# Patient Record
Sex: FEMALE | Race: WHITE | NOT HISPANIC OR LATINO | Employment: OTHER | URBAN - METROPOLITAN AREA
[De-identification: names, ages, dates, MRNs, and addresses within clinical notes are randomized per-mention and may not be internally consistent; named-entity substitution may affect disease eponyms.]

---

## 2017-01-16 ENCOUNTER — FOLLOW UP AND POST INJECTION EVALUATION (OUTPATIENT)
Dept: URBAN - METROPOLITAN AREA CLINIC 26 | Facility: CLINIC | Age: 62
End: 2017-01-16

## 2017-01-16 VITALS — DIASTOLIC BLOOD PRESSURE: 80 MMHG | HEIGHT: 55 IN | HEART RATE: 82 BPM | SYSTOLIC BLOOD PRESSURE: 120 MMHG

## 2017-01-16 DIAGNOSIS — H02.836: ICD-10-CM

## 2017-01-16 DIAGNOSIS — H25.9: ICD-10-CM

## 2017-01-16 DIAGNOSIS — H35.3211: ICD-10-CM

## 2017-01-16 DIAGNOSIS — H35.713: ICD-10-CM

## 2017-01-16 DIAGNOSIS — H02.833: ICD-10-CM

## 2017-01-16 DIAGNOSIS — H35.3122: ICD-10-CM

## 2017-01-16 DIAGNOSIS — H43.393: ICD-10-CM

## 2017-01-16 PROCEDURE — 92250 FUNDUS PHOTOGRAPHY W/I&R: CPT

## 2017-01-16 PROCEDURE — 92235 FLUORESCEIN ANGRPH MLTIFRAME: CPT

## 2017-01-16 PROCEDURE — 92014 COMPRE OPH EXAM EST PT 1/>: CPT

## 2017-01-16 ASSESSMENT — TONOMETRY
OS_IOP_MMHG: 17
OD_IOP_MMHG: 17

## 2017-01-16 ASSESSMENT — VISUAL ACUITY
OD_SC: 20/25-2
OS_SC: 20/25+1

## 2017-01-23 ENCOUNTER — CLINIC PROCEDURE ONLY (OUTPATIENT)
Dept: URBAN - METROPOLITAN AREA CLINIC 26 | Facility: CLINIC | Age: 62
End: 2017-01-23

## 2017-01-23 DIAGNOSIS — H35.3211: ICD-10-CM

## 2017-01-23 PROCEDURE — 67028 INJECTION EYE DRUG: CPT

## 2017-01-23 ASSESSMENT — TONOMETRY: OD_IOP_MMHG: 13

## 2017-01-23 ASSESSMENT — VISUAL ACUITY: OD_SC: 20/25-2

## 2017-04-03 NOTE — PATIENT DISCUSSION
(C00.1224) Primary open-angle glaucoma left eye mild stage - Assesment : Examination revealed Primary Open Angle Glaucoma. Iop slightly elevated today. - Plan : Monitor for changes. Advised patient to call our office with decreased vision or an increase in symptoms.  Rba's discussed with patient, patient wishes to proceed with SLT OS SLT OS

## 2017-04-17 ENCOUNTER — FOLLOW UP (OUTPATIENT)
Dept: URBAN - METROPOLITAN AREA CLINIC 26 | Facility: CLINIC | Age: 62
End: 2017-04-17

## 2017-04-17 VITALS — HEIGHT: 55 IN | HEART RATE: 84 BPM | SYSTOLIC BLOOD PRESSURE: 119 MMHG | DIASTOLIC BLOOD PRESSURE: 84 MMHG

## 2017-04-17 DIAGNOSIS — H43.393: ICD-10-CM

## 2017-04-17 DIAGNOSIS — H02.836: ICD-10-CM

## 2017-04-17 DIAGNOSIS — H25.9: ICD-10-CM

## 2017-04-17 DIAGNOSIS — H35.713: ICD-10-CM

## 2017-04-17 DIAGNOSIS — H02.833: ICD-10-CM

## 2017-04-17 DIAGNOSIS — H35.3211: ICD-10-CM

## 2017-04-17 DIAGNOSIS — H35.3122: ICD-10-CM

## 2017-04-17 PROCEDURE — 92134 CPTRZ OPH DX IMG PST SGM RTA: CPT

## 2017-04-17 PROCEDURE — 92014 COMPRE OPH EXAM EST PT 1/>: CPT

## 2017-04-17 ASSESSMENT — TONOMETRY
OD_IOP_MMHG: 11
OS_IOP_MMHG: 12

## 2017-04-17 ASSESSMENT — VISUAL ACUITY
OD_SC: 20/30-
OS_SC: 20/25-

## 2017-04-24 ENCOUNTER — CLINIC PROCEDURE ONLY (OUTPATIENT)
Dept: URBAN - METROPOLITAN AREA CLINIC 26 | Facility: CLINIC | Age: 62
End: 2017-04-24

## 2017-04-24 DIAGNOSIS — H35.3211: ICD-10-CM

## 2017-04-24 PROCEDURE — 67028 INJECTION EYE DRUG: CPT

## 2017-04-24 ASSESSMENT — VISUAL ACUITY
OD_SC: 20/30-2
OS_SC: 20/20-1

## 2017-04-24 ASSESSMENT — TONOMETRY
OD_IOP_MMHG: 12
OS_IOP_MMHG: 11

## 2017-06-12 ENCOUNTER — FOLLOW UP AND POST INJECTION EVALUATION (OUTPATIENT)
Dept: URBAN - METROPOLITAN AREA CLINIC 26 | Facility: CLINIC | Age: 62
End: 2017-06-12

## 2017-06-12 VITALS
DIASTOLIC BLOOD PRESSURE: 76 MMHG | WEIGHT: 107 LBS | HEIGHT: 65 IN | BODY MASS INDEX: 17.83 KG/M2 | HEART RATE: 74 BPM | SYSTOLIC BLOOD PRESSURE: 116 MMHG

## 2017-06-12 DIAGNOSIS — H35.713: ICD-10-CM

## 2017-06-12 DIAGNOSIS — H35.3122: ICD-10-CM

## 2017-06-12 DIAGNOSIS — H43.393: ICD-10-CM

## 2017-06-12 DIAGNOSIS — H35.3211: ICD-10-CM

## 2017-06-12 PROCEDURE — 92250 FUNDUS PHOTOGRAPHY W/I&R: CPT

## 2017-06-12 PROCEDURE — 92014 COMPRE OPH EXAM EST PT 1/>: CPT

## 2017-06-12 ASSESSMENT — TONOMETRY
OS_IOP_MMHG: 13
OD_IOP_MMHG: 13

## 2017-06-12 ASSESSMENT — VISUAL ACUITY
OS_SC: 20/25-1
OD_SC: 20/50+2

## 2017-06-14 ENCOUNTER — CLINIC PROCEDURE ONLY (OUTPATIENT)
Dept: URBAN - METROPOLITAN AREA CLINIC 26 | Facility: CLINIC | Age: 62
End: 2017-06-14

## 2017-06-14 DIAGNOSIS — H35.3211: ICD-10-CM

## 2017-06-14 PROCEDURE — 67028 INJECTION EYE DRUG: CPT

## 2017-06-14 ASSESSMENT — TONOMETRY: OD_IOP_MMHG: 10

## 2017-06-14 ASSESSMENT — VISUAL ACUITY: OD_SC: 20/50-1

## 2017-06-20 ENCOUNTER — DIAGNOSTICS ONLY (OUTPATIENT)
Dept: URBAN - METROPOLITAN AREA CLINIC 26 | Facility: CLINIC | Age: 62
End: 2017-06-20

## 2017-06-20 DIAGNOSIS — H35.3211: ICD-10-CM

## 2017-06-20 DIAGNOSIS — H35.3122: ICD-10-CM

## 2017-06-20 PROCEDURE — 92134 CPTRZ OPH DX IMG PST SGM RTA: CPT

## 2017-08-01 ENCOUNTER — FOLLOW UP (OUTPATIENT)
Dept: URBAN - METROPOLITAN AREA CLINIC 26 | Facility: CLINIC | Age: 62
End: 2017-08-01

## 2017-08-01 DIAGNOSIS — H02.833: ICD-10-CM

## 2017-08-01 DIAGNOSIS — H35.3122: ICD-10-CM

## 2017-08-01 DIAGNOSIS — H02.836: ICD-10-CM

## 2017-08-01 DIAGNOSIS — H04.123: ICD-10-CM

## 2017-08-01 DIAGNOSIS — H43.393: ICD-10-CM

## 2017-08-01 DIAGNOSIS — H35.713: ICD-10-CM

## 2017-08-01 DIAGNOSIS — H35.3211: ICD-10-CM

## 2017-08-01 DIAGNOSIS — H25.9: ICD-10-CM

## 2017-08-01 PROCEDURE — 92014 COMPRE OPH EXAM EST PT 1/>: CPT

## 2017-08-01 PROCEDURE — 92250 FUNDUS PHOTOGRAPHY W/I&R: CPT

## 2017-08-01 PROCEDURE — 92134 CPTRZ OPH DX IMG PST SGM RTA: CPT

## 2017-08-01 ASSESSMENT — TONOMETRY
OD_IOP_MMHG: 11
OS_IOP_MMHG: 10

## 2017-08-01 ASSESSMENT — VISUAL ACUITY
OS_SC: 20/25+1
OD_SC: 20/40-1

## 2017-08-07 ENCOUNTER — CLINIC PROCEDURE ONLY (OUTPATIENT)
Dept: URBAN - METROPOLITAN AREA CLINIC 26 | Facility: CLINIC | Age: 62
End: 2017-08-07

## 2017-08-07 DIAGNOSIS — H35.3211: ICD-10-CM

## 2017-08-07 PROCEDURE — 67028 INJECTION EYE DRUG: CPT

## 2017-08-07 ASSESSMENT — TONOMETRY: OD_IOP_MMHG: 14

## 2017-08-07 ASSESSMENT — VISUAL ACUITY: OD_CC: 20/40-

## 2017-09-18 ENCOUNTER — CLINIC PROCEDURE ONLY (OUTPATIENT)
Dept: URBAN - METROPOLITAN AREA CLINIC 26 | Facility: CLINIC | Age: 62
End: 2017-09-18

## 2017-09-18 DIAGNOSIS — H35.3211: ICD-10-CM

## 2017-09-18 PROCEDURE — 67028 INJECTION EYE DRUG: CPT

## 2017-09-18 ASSESSMENT — VISUAL ACUITY: OD_CC: 20/40-

## 2017-09-18 ASSESSMENT — TONOMETRY: OD_IOP_MMHG: 14

## 2017-09-18 NOTE — PATIENT DISCUSSION
(H11.153) Pinguecula, bilateral - Assesment : Examination revealed Pinguelcula, elevation OS>OD. Patient unsure if growing or changing - Plan : Monitor for changes. Advised patient to call our office with decreased vision or increased symptoms. Monitor for changes, External photo today for future comparison.  Advised patient to monitor at home for changes, discussed the risks of progression

## 2017-09-18 NOTE — PATIENT DISCUSSION
(O40.1037) Primary open-angle glaucoma left eye mild stage - Assesment : Examination revealed Primary Open Angle Glaucoma. IOP is fluctuating os C/D ASYMMETRY - Plan : Monitor for changes. Advised patient to call our office with decreased vision or an increase in symptoms.  SLT OS Scheduled for October, keep scheduled appt

## 2017-10-23 NOTE — PATIENT DISCUSSION
(X84.2993) Primary open-angle glaucoma left eye mild stage - Assesment : Examination revealed Primary Open Angle Glaucoma. IOP OU WITHIN NORMAL LIMITS, OS IOP LOWER SINCE SLT OS. C/D ASYMMETRY - Plan : Monitor for changes. Advised patient to call our office with decreased vision or an increase in symptoms.  4 MONTH/ EXAM

## 2017-10-30 ENCOUNTER — CLINIC PROCEDURE ONLY (OUTPATIENT)
Dept: URBAN - METROPOLITAN AREA CLINIC 26 | Facility: CLINIC | Age: 62
End: 2017-10-30

## 2017-10-30 DIAGNOSIS — H35.3211: ICD-10-CM

## 2017-10-30 PROCEDURE — 67028 INJECTION EYE DRUG: CPT

## 2017-10-30 ASSESSMENT — TONOMETRY: OD_IOP_MMHG: 15

## 2017-10-30 ASSESSMENT — VISUAL ACUITY: OD_SC: 20/40-

## 2017-12-11 ENCOUNTER — FOLLOW UP AND POST INJECTION EVALUATION (OUTPATIENT)
Dept: URBAN - METROPOLITAN AREA CLINIC 26 | Facility: CLINIC | Age: 62
End: 2017-12-11

## 2017-12-11 VITALS — HEART RATE: 73 BPM | SYSTOLIC BLOOD PRESSURE: 132 MMHG | DIASTOLIC BLOOD PRESSURE: 87 MMHG | HEIGHT: 55 IN

## 2017-12-11 DIAGNOSIS — H35.3211: ICD-10-CM

## 2017-12-11 DIAGNOSIS — H35.713: ICD-10-CM

## 2017-12-11 DIAGNOSIS — H04.123: ICD-10-CM

## 2017-12-11 DIAGNOSIS — H25.9: ICD-10-CM

## 2017-12-11 DIAGNOSIS — H35.3122: ICD-10-CM

## 2017-12-11 DIAGNOSIS — H02.833: ICD-10-CM

## 2017-12-11 DIAGNOSIS — H02.836: ICD-10-CM

## 2017-12-11 DIAGNOSIS — H43.393: ICD-10-CM

## 2017-12-11 PROCEDURE — 1036F TOBACCO NON-USER: CPT

## 2017-12-11 PROCEDURE — 92250 FUNDUS PHOTOGRAPHY W/I&R: CPT

## 2017-12-11 PROCEDURE — 92014 COMPRE OPH EXAM EST PT 1/>: CPT

## 2017-12-11 PROCEDURE — 4040F PNEUMOC VAC/ADMIN/RCVD: CPT

## 2017-12-11 PROCEDURE — G8482 FLU IMMUNIZE ORDER/ADMIN: HCPCS

## 2017-12-11 PROCEDURE — 92134 CPTRZ OPH DX IMG PST SGM RTA: CPT

## 2017-12-11 PROCEDURE — 4177F TALK PT/CRGVR RE AREDS PREV: CPT

## 2017-12-11 PROCEDURE — 2019F DILATED MACUL EXAM DONE: CPT

## 2017-12-11 PROCEDURE — G8427 DOCREV CUR MEDS BY ELIG CLIN: HCPCS

## 2017-12-11 ASSESSMENT — TONOMETRY
OD_IOP_MMHG: 15
OS_IOP_MMHG: 11

## 2017-12-11 ASSESSMENT — VISUAL ACUITY
OD_SC: 20/30+2
OS_SC: 20/25+1

## 2017-12-13 ENCOUNTER — CLINIC PROCEDURE ONLY (OUTPATIENT)
Dept: URBAN - METROPOLITAN AREA CLINIC 26 | Facility: CLINIC | Age: 62
End: 2017-12-13

## 2017-12-13 DIAGNOSIS — H35.3211: ICD-10-CM

## 2017-12-13 PROCEDURE — 67028 INJECTION EYE DRUG: CPT

## 2017-12-13 ASSESSMENT — VISUAL ACUITY: OD_SC: 20/25-3

## 2017-12-13 ASSESSMENT — TONOMETRY: OD_IOP_MMHG: 15

## 2017-12-21 ENCOUNTER — ADDENDUM (OUTPATIENT)
Dept: URBAN - METROPOLITAN AREA CLINIC 26 | Facility: CLINIC | Age: 62
End: 2017-12-21

## 2018-01-22 ENCOUNTER — CLINIC PROCEDURE ONLY (OUTPATIENT)
Dept: URBAN - METROPOLITAN AREA CLINIC 26 | Facility: CLINIC | Age: 63
End: 2018-01-22

## 2018-01-22 DIAGNOSIS — H35.3211: ICD-10-CM

## 2018-01-22 PROCEDURE — 67028 INJECTION EYE DRUG: CPT

## 2018-01-22 ASSESSMENT — VISUAL ACUITY: OD_CC: 20/30-

## 2018-01-22 ASSESSMENT — TONOMETRY: OD_IOP_MMHG: 13

## 2018-02-05 NOTE — PATIENT DISCUSSION
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 - Plan : Monitor for changes. Blood Sugar control with PCP. Keep A1c level below 6.5. Daily blood sugar control. Healthy diet and exercise.

## 2018-02-05 NOTE — PATIENT DISCUSSION
(H22.3535) Primary open-angle glaucoma left eye mild stage - Assesment : Examination revealed Primary Open Angle Glaucoma. IOP OS TRENDING UP S/P SLT OS 10/2017  C/D ASYMMETRY  RECOMMEND CONSULT WITH DR. RODRIGUEZ FOR FURTHER EVALUATION - Plan : Monitor for changes. Advised patient to call our office with decreased vision or an increase in symptoms. 2 MONTHS/ 24-2/ TENSION CHECK WITH DR. RODRIGUEZ

## 2018-02-05 NOTE — PATIENT DISCUSSION
(E11.9) Type 2 diabetes mellitus without complications - Assesment : Examination reveals Type 2 Diabetes mellitus without ocular complications-Right eye. - Plan : Monitor for changes. Blood Sugar control with PCP. Keep A1c level below 6.5. Daily blood sugar control. Healthy diet and exercise. Letter explaining today's findings faxed to patient's PCP.

## 2018-03-12 ENCOUNTER — CLINIC PROCEDURE ONLY (OUTPATIENT)
Dept: URBAN - METROPOLITAN AREA CLINIC 26 | Facility: CLINIC | Age: 63
End: 2018-03-12

## 2018-03-12 DIAGNOSIS — H35.3211: ICD-10-CM

## 2018-03-12 PROCEDURE — 67028 INJECTION EYE DRUG: CPT

## 2018-03-12 ASSESSMENT — VISUAL ACUITY: OD_SC: 20/30+1

## 2018-03-12 ASSESSMENT — TONOMETRY: OD_IOP_MMHG: 10

## 2018-04-09 NOTE — PATIENT DISCUSSION
(Q67.1538) Primary open-angle glaucoma left eye mild stage - Assesment : Examination revealed Primary Open Angle Glaucoma. Baseline HVF today. s/p SLT OS 10/2017 - Plan : Monitor for IOP and NFL changes with visits and testing. RTC in 6 months for Exam and OCT ONH, sooner if problems or changes.

## 2018-04-23 ENCOUNTER — CLINIC PROCEDURE ONLY (OUTPATIENT)
Dept: URBAN - METROPOLITAN AREA CLINIC 26 | Facility: CLINIC | Age: 63
End: 2018-04-23

## 2018-04-23 DIAGNOSIS — H35.3211: ICD-10-CM

## 2018-04-23 PROCEDURE — 67028 INJECTION EYE DRUG: CPT

## 2018-04-23 ASSESSMENT — VISUAL ACUITY: OD_SC: 20/30+2

## 2018-04-23 ASSESSMENT — TONOMETRY: OD_IOP_MMHG: 13

## 2018-05-29 ENCOUNTER — FOLLOW UP (OUTPATIENT)
Dept: URBAN - METROPOLITAN AREA CLINIC 26 | Facility: CLINIC | Age: 63
End: 2018-05-29

## 2018-05-29 VITALS — HEIGHT: 55 IN | HEART RATE: 79 BPM | SYSTOLIC BLOOD PRESSURE: 132 MMHG | DIASTOLIC BLOOD PRESSURE: 81 MMHG

## 2018-05-29 DIAGNOSIS — H43.393: ICD-10-CM

## 2018-05-29 DIAGNOSIS — H04.123: ICD-10-CM

## 2018-05-29 DIAGNOSIS — H02.833: ICD-10-CM

## 2018-05-29 DIAGNOSIS — H35.3211: ICD-10-CM

## 2018-05-29 DIAGNOSIS — H25.9: ICD-10-CM

## 2018-05-29 DIAGNOSIS — H35.713: ICD-10-CM

## 2018-05-29 DIAGNOSIS — H02.836: ICD-10-CM

## 2018-05-29 DIAGNOSIS — H35.3122: ICD-10-CM

## 2018-05-29 PROCEDURE — 92134 CPTRZ OPH DX IMG PST SGM RTA: CPT

## 2018-05-29 PROCEDURE — 92250 FUNDUS PHOTOGRAPHY W/I&R: CPT

## 2018-05-29 PROCEDURE — 92014 COMPRE OPH EXAM EST PT 1/>: CPT

## 2018-05-29 ASSESSMENT — VISUAL ACUITY
OD_SC: 20/30+2
OS_SC: 20/20-2

## 2018-05-29 ASSESSMENT — TONOMETRY
OD_IOP_MMHG: 12
OS_IOP_MMHG: 11

## 2018-06-06 ENCOUNTER — CLINIC PROCEDURE ONLY (OUTPATIENT)
Dept: URBAN - METROPOLITAN AREA CLINIC 26 | Facility: CLINIC | Age: 63
End: 2018-06-06

## 2018-06-06 DIAGNOSIS — H35.3211: ICD-10-CM

## 2018-06-06 PROCEDURE — 67028 INJECTION EYE DRUG: CPT

## 2018-06-06 ASSESSMENT — TONOMETRY: OD_IOP_MMHG: 13

## 2018-06-06 ASSESSMENT — VISUAL ACUITY: OD_SC: 20/30-

## 2018-07-10 ENCOUNTER — CLINICAL PROCEDURE AND DIAGNOSTIC TESTING ONLY (OUTPATIENT)
Dept: URBAN - METROPOLITAN AREA CLINIC 26 | Facility: CLINIC | Age: 63
End: 2018-07-10

## 2018-07-10 DIAGNOSIS — H35.3122: ICD-10-CM

## 2018-07-10 DIAGNOSIS — H35.3211: ICD-10-CM

## 2018-07-10 PROCEDURE — 67028 INJECTION EYE DRUG: CPT

## 2018-07-10 PROCEDURE — 92250 FUNDUS PHOTOGRAPHY W/I&R: CPT

## 2018-07-10 ASSESSMENT — VISUAL ACUITY: OD_SC: 20/30-1

## 2018-07-10 ASSESSMENT — TONOMETRY: OD_IOP_MMHG: 18

## 2018-08-21 ENCOUNTER — CLINICAL PROCEDURE AND DIAGNOSTIC TESTING ONLY (OUTPATIENT)
Dept: URBAN - METROPOLITAN AREA CLINIC 26 | Facility: CLINIC | Age: 63
End: 2018-08-21

## 2018-08-21 DIAGNOSIS — H35.3211: ICD-10-CM

## 2018-08-21 DIAGNOSIS — H35.3122: ICD-10-CM

## 2018-08-21 PROCEDURE — 92134 CPTRZ OPH DX IMG PST SGM RTA: CPT

## 2018-08-21 PROCEDURE — 67028 INJECTION EYE DRUG: CPT

## 2018-08-21 ASSESSMENT — VISUAL ACUITY: OD_SC: 20/30-2

## 2018-08-21 ASSESSMENT — TONOMETRY: OD_IOP_MMHG: 16

## 2018-10-02 ENCOUNTER — CLINICAL PROCEDURE AND DIAGNOSTIC TESTING ONLY (OUTPATIENT)
Dept: URBAN - METROPOLITAN AREA CLINIC 26 | Facility: CLINIC | Age: 63
End: 2018-10-02

## 2018-10-02 DIAGNOSIS — H35.3211: ICD-10-CM

## 2018-10-02 DIAGNOSIS — H35.3122: ICD-10-CM

## 2018-10-02 PROCEDURE — 67028 INJECTION EYE DRUG: CPT

## 2018-10-02 PROCEDURE — 92250 FUNDUS PHOTOGRAPHY W/I&R: CPT

## 2018-10-02 ASSESSMENT — TONOMETRY: OD_IOP_MMHG: 13

## 2018-10-02 ASSESSMENT — VISUAL ACUITY: OD_SC: 20/30+2

## 2018-10-15 NOTE — PATIENT DISCUSSION
(N38.4072) Primary open-angle glaucoma left eye mild stage - Assesment : Examination revealed Primary Open Angle Glaucoma OS IOP TRENDING UP, RECOMMEND REPEATING SLT OS TO TRY AND LOWER IOP , CONSIDER GTT TX OD IF IOP OS DOESN'T LOWER AFTER SLT OS - Plan : SLT OS  6 MONTH 24-2/ TN CHECK

## 2018-10-15 NOTE — PATIENT DISCUSSION
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 Mild non proliferative diabetic retinopathy. - Plan : Monitor for changes. Blood Sugar control with PCP. Keep A1c level below 6.5. Daily blood sugar control. Healthy diet and exercise.

## 2018-11-15 ENCOUNTER — FOLLOW UP AND POST INJECTION EVALUATION (OUTPATIENT)
Dept: URBAN - METROPOLITAN AREA CLINIC 26 | Facility: CLINIC | Age: 63
End: 2018-11-15

## 2018-11-15 DIAGNOSIS — H35.3211: ICD-10-CM

## 2018-11-15 DIAGNOSIS — H25.9: ICD-10-CM

## 2018-11-15 DIAGNOSIS — H02.833: ICD-10-CM

## 2018-11-15 DIAGNOSIS — H35.713: ICD-10-CM

## 2018-11-15 DIAGNOSIS — H04.123: ICD-10-CM

## 2018-11-15 DIAGNOSIS — H43.393: ICD-10-CM

## 2018-11-15 DIAGNOSIS — H02.836: ICD-10-CM

## 2018-11-15 DIAGNOSIS — H35.3122: ICD-10-CM

## 2018-11-15 PROCEDURE — 92250 FUNDUS PHOTOGRAPHY W/I&R: CPT

## 2018-11-15 PROCEDURE — 92014 COMPRE OPH EXAM EST PT 1/>: CPT

## 2018-11-15 ASSESSMENT — VISUAL ACUITY
OS_SC: 20/20
OD_SC: 20/30+1

## 2018-11-15 ASSESSMENT — TONOMETRY
OD_IOP_MMHG: 18
OS_IOP_MMHG: 16

## 2018-11-19 ENCOUNTER — CLINIC PROCEDURE ONLY (OUTPATIENT)
Dept: URBAN - METROPOLITAN AREA CLINIC 26 | Facility: CLINIC | Age: 63
End: 2018-11-19

## 2018-11-19 DIAGNOSIS — H35.3211: ICD-10-CM

## 2018-11-19 PROCEDURE — 67028 INJECTION EYE DRUG: CPT

## 2018-11-19 ASSESSMENT — TONOMETRY: OD_IOP_MMHG: 12

## 2018-11-19 ASSESSMENT — VISUAL ACUITY: OD_SC: 20/30+1

## 2019-01-02 ENCOUNTER — CLINICAL PROCEDURE AND DIAGNOSTIC TESTING ONLY (OUTPATIENT)
Dept: URBAN - METROPOLITAN AREA CLINIC 26 | Facility: CLINIC | Age: 64
End: 2019-01-02

## 2019-01-02 DIAGNOSIS — H35.3122: ICD-10-CM

## 2019-01-02 DIAGNOSIS — H35.3211: ICD-10-CM

## 2019-01-02 PROCEDURE — 92250 FUNDUS PHOTOGRAPHY W/I&R: CPT

## 2019-01-02 PROCEDURE — 67028 INJECTION EYE DRUG: CPT

## 2019-01-02 PROCEDURE — 92134 CPTRZ OPH DX IMG PST SGM RTA: CPT

## 2019-01-02 ASSESSMENT — TONOMETRY: OD_IOP_MMHG: 12

## 2019-01-02 ASSESSMENT — VISUAL ACUITY: OD_SC: 20/30-1

## 2019-02-14 ENCOUNTER — CLINICAL PROCEDURE AND DIAGNOSTIC TESTING ONLY (OUTPATIENT)
Dept: URBAN - METROPOLITAN AREA CLINIC 26 | Facility: CLINIC | Age: 64
End: 2019-02-14

## 2019-02-14 DIAGNOSIS — H35.3122: ICD-10-CM

## 2019-02-14 DIAGNOSIS — H35.3211: ICD-10-CM

## 2019-02-14 PROCEDURE — 67028 INJECTION EYE DRUG: CPT

## 2019-02-14 PROCEDURE — 92134 CPTRZ OPH DX IMG PST SGM RTA: CPT

## 2019-02-14 PROCEDURE — 92250 FUNDUS PHOTOGRAPHY W/I&R: CPT

## 2019-02-14 ASSESSMENT — VISUAL ACUITY: OD_SC: 20/40+2

## 2019-02-14 ASSESSMENT — TONOMETRY: OD_IOP_MMHG: 12

## 2019-04-04 ENCOUNTER — FOLLOW UP AND POST INJECTION EVALUATION (OUTPATIENT)
Dept: URBAN - METROPOLITAN AREA CLINIC 26 | Facility: CLINIC | Age: 64
End: 2019-04-04

## 2019-04-04 VITALS — HEIGHT: 64 IN | WEIGHT: 105 LBS | BODY MASS INDEX: 17.93 KG/M2

## 2019-04-04 DIAGNOSIS — H43.393: ICD-10-CM

## 2019-04-04 DIAGNOSIS — H43.811: ICD-10-CM

## 2019-04-04 DIAGNOSIS — H35.3211: ICD-10-CM

## 2019-04-04 DIAGNOSIS — H35.713: ICD-10-CM

## 2019-04-04 DIAGNOSIS — H35.3122: ICD-10-CM

## 2019-04-04 PROCEDURE — 92014 COMPRE OPH EXAM EST PT 1/>: CPT

## 2019-04-04 PROCEDURE — 92250 FUNDUS PHOTOGRAPHY W/I&R: CPT

## 2019-04-04 PROCEDURE — 92134 CPTRZ OPH DX IMG PST SGM RTA: CPT

## 2019-04-04 ASSESSMENT — VISUAL ACUITY
OD_SC: 20/20-
OS_SC: 20/15-

## 2019-04-04 ASSESSMENT — TONOMETRY
OD_IOP_MMHG: 11
OS_IOP_MMHG: 12

## 2019-04-11 ENCOUNTER — PROCEDURE ONLY (OUTPATIENT)
Dept: URBAN - METROPOLITAN AREA CLINIC 26 | Facility: CLINIC | Age: 64
End: 2019-04-11

## 2019-04-11 DIAGNOSIS — H35.3211: ICD-10-CM

## 2019-04-11 PROCEDURE — 67028 INJECTION EYE DRUG: CPT

## 2019-04-11 ASSESSMENT — TONOMETRY: OD_IOP_MMHG: 11

## 2019-04-11 ASSESSMENT — VISUAL ACUITY: OD_SC: 20/30-2

## 2019-06-06 ENCOUNTER — CLINICAL PROCEDURE AND DIAGNOSTIC TESTING ONLY (OUTPATIENT)
Dept: URBAN - METROPOLITAN AREA CLINIC 26 | Facility: CLINIC | Age: 64
End: 2019-06-06

## 2019-06-06 DIAGNOSIS — H35.3211: ICD-10-CM

## 2019-06-06 DIAGNOSIS — H35.713: ICD-10-CM

## 2019-06-06 PROCEDURE — 67028 INJECTION EYE DRUG: CPT

## 2019-06-06 PROCEDURE — 92250 FUNDUS PHOTOGRAPHY W/I&R: CPT

## 2019-06-06 PROCEDURE — 92134 CPTRZ OPH DX IMG PST SGM RTA: CPT

## 2019-06-06 ASSESSMENT — TONOMETRY: OD_IOP_MMHG: 13

## 2019-06-06 ASSESSMENT — VISUAL ACUITY: OD_SC: 20/30-1

## 2019-06-06 NOTE — PATIENT DISCUSSION
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

## 2019-06-06 NOTE — PATIENT DISCUSSION
(Y84.5141) Primary open-angle glaucoma left eye mild stage - Assesment : Examination revealed Primary Open Angle Glaucoma   C/D ASYMMETRY IOP OU WNL TODAY - Plan : OBSERVATION  6 MONTH/ TN CHECK Visual field performed.

## 2019-06-06 NOTE — PATIENT DISCUSSION
(F10.947) Vitreous degeneration, bilateral - Assesment : Examination revealed PVD - Plan : Monitor for changes. Advised patient to call our office with decreased vision or an increase in flashes and/or floaters.

## 2019-08-12 ENCOUNTER — CLINICAL PROCEDURE AND DIAGNOSTIC TESTING ONLY (OUTPATIENT)
Dept: URBAN - METROPOLITAN AREA CLINIC 26 | Facility: CLINIC | Age: 64
End: 2019-08-12

## 2019-08-12 DIAGNOSIS — H35.713: ICD-10-CM

## 2019-08-12 DIAGNOSIS — H35.3211: ICD-10-CM

## 2019-08-12 PROCEDURE — 67028 INJECTION EYE DRUG: CPT

## 2019-08-12 PROCEDURE — 92134 CPTRZ OPH DX IMG PST SGM RTA: CPT

## 2019-08-12 PROCEDURE — 92250 FUNDUS PHOTOGRAPHY W/I&R: CPT

## 2019-08-12 ASSESSMENT — TONOMETRY: OD_IOP_MMHG: 15

## 2019-08-12 ASSESSMENT — VISUAL ACUITY: OD_SC: 20/25-2

## 2019-10-07 ENCOUNTER — FOLLOW UP AND POST INJECTION EVALUATION (OUTPATIENT)
Dept: URBAN - METROPOLITAN AREA CLINIC 26 | Facility: CLINIC | Age: 64
End: 2019-10-07

## 2019-10-07 DIAGNOSIS — H35.713: ICD-10-CM

## 2019-10-07 DIAGNOSIS — H02.836: ICD-10-CM

## 2019-10-07 DIAGNOSIS — H02.833: ICD-10-CM

## 2019-10-07 DIAGNOSIS — H25.9: ICD-10-CM

## 2019-10-07 DIAGNOSIS — H35.3211: ICD-10-CM

## 2019-10-07 DIAGNOSIS — H43.393: ICD-10-CM

## 2019-10-07 DIAGNOSIS — H35.3122: ICD-10-CM

## 2019-10-07 DIAGNOSIS — H43.811: ICD-10-CM

## 2019-10-07 DIAGNOSIS — H04.123: ICD-10-CM

## 2019-10-07 PROCEDURE — 92134 CPTRZ OPH DX IMG PST SGM RTA: CPT

## 2019-10-07 PROCEDURE — 92250 FUNDUS PHOTOGRAPHY W/I&R: CPT

## 2019-10-07 PROCEDURE — 92014 COMPRE OPH EXAM EST PT 1/>: CPT

## 2019-10-07 ASSESSMENT — VISUAL ACUITY
OS_SC: 20/20
OD_SC: 20/30+2

## 2019-10-07 ASSESSMENT — TONOMETRY
OS_IOP_MMHG: 16
OD_IOP_MMHG: 18

## 2019-10-14 ENCOUNTER — CLINIC PROCEDURE ONLY (OUTPATIENT)
Dept: URBAN - METROPOLITAN AREA CLINIC 26 | Facility: CLINIC | Age: 64
End: 2019-10-14

## 2019-10-14 DIAGNOSIS — H35.3211: ICD-10-CM

## 2019-10-14 PROCEDURE — 67028 INJECTION EYE DRUG: CPT

## 2019-10-14 ASSESSMENT — TONOMETRY: OD_IOP_MMHG: 18

## 2019-10-14 ASSESSMENT — VISUAL ACUITY: OD_SC: 20/30-1

## 2019-11-18 ENCOUNTER — CLINICAL PROCEDURE AND DIAGNOSTIC TESTING ONLY (OUTPATIENT)
Dept: URBAN - METROPOLITAN AREA CLINIC 26 | Facility: CLINIC | Age: 64
End: 2019-11-18

## 2019-11-18 DIAGNOSIS — H35.3211: ICD-10-CM

## 2019-11-18 PROCEDURE — 67028 INJECTION EYE DRUG: CPT

## 2019-11-18 PROCEDURE — 92250 FUNDUS PHOTOGRAPHY W/I&R: CPT

## 2019-11-18 ASSESSMENT — TONOMETRY: OD_IOP_MMHG: 16

## 2019-11-18 ASSESSMENT — VISUAL ACUITY: OD_SC: 20/30-2

## 2019-12-18 ENCOUNTER — CLINICAL PROCEDURE AND DIAGNOSTIC TESTING ONLY (OUTPATIENT)
Dept: URBAN - METROPOLITAN AREA CLINIC 26 | Facility: CLINIC | Age: 64
End: 2019-12-18

## 2019-12-18 DIAGNOSIS — H35.3122: ICD-10-CM

## 2019-12-18 DIAGNOSIS — H35.3211: ICD-10-CM

## 2019-12-18 PROCEDURE — 67028 INJECTION EYE DRUG: CPT

## 2019-12-18 PROCEDURE — 92134 CPTRZ OPH DX IMG PST SGM RTA: CPT

## 2019-12-18 ASSESSMENT — VISUAL ACUITY: OD_SC: 20/30+2

## 2019-12-18 ASSESSMENT — TONOMETRY: OD_IOP_MMHG: 16

## 2020-01-23 ENCOUNTER — FOLLOW UP AND POST INJECTION EVALUATION (OUTPATIENT)
Dept: URBAN - METROPOLITAN AREA CLINIC 26 | Facility: CLINIC | Age: 65
End: 2020-01-23

## 2020-01-23 DIAGNOSIS — H35.3211: ICD-10-CM

## 2020-01-23 DIAGNOSIS — H35.3122: ICD-10-CM

## 2020-01-23 PROCEDURE — 67028 INJECTION EYE DRUG: CPT

## 2020-01-23 PROCEDURE — 92250 FUNDUS PHOTOGRAPHY W/I&R: CPT

## 2020-01-23 ASSESSMENT — TONOMETRY: OD_IOP_MMHG: 15

## 2020-01-23 ASSESSMENT — VISUAL ACUITY: OD_SC: 20/20-2

## 2020-02-27 ENCOUNTER — CLINICAL PROCEDURE AND DIAGNOSTIC TESTING ONLY (OUTPATIENT)
Dept: URBAN - METROPOLITAN AREA CLINIC 26 | Facility: CLINIC | Age: 65
End: 2020-02-27

## 2020-02-27 DIAGNOSIS — H35.3122: ICD-10-CM

## 2020-02-27 DIAGNOSIS — H35.3211: ICD-10-CM

## 2020-02-27 PROCEDURE — 67028 INJECTION EYE DRUG: CPT

## 2020-02-27 PROCEDURE — 92134 CPTRZ OPH DX IMG PST SGM RTA: CPT

## 2020-02-27 ASSESSMENT — VISUAL ACUITY: OD_SC: 20/30-2

## 2020-02-27 ASSESSMENT — TONOMETRY: OD_IOP_MMHG: 11

## 2020-04-02 ENCOUNTER — FOLLOW UP AND POST INJECTION EVALUATION (OUTPATIENT)
Dept: URBAN - METROPOLITAN AREA CLINIC 26 | Facility: CLINIC | Age: 65
End: 2020-04-02

## 2020-04-02 VITALS — BODY MASS INDEX: 17.93 KG/M2 | WEIGHT: 105 LBS | HEIGHT: 64 IN

## 2020-04-02 DIAGNOSIS — H43.811: ICD-10-CM

## 2020-04-02 DIAGNOSIS — H35.3211: ICD-10-CM

## 2020-04-02 DIAGNOSIS — H43.393: ICD-10-CM

## 2020-04-02 DIAGNOSIS — H35.713: ICD-10-CM

## 2020-04-02 DIAGNOSIS — H35.3122: ICD-10-CM

## 2020-04-02 PROCEDURE — 92134 CPTRZ OPH DX IMG PST SGM RTA: CPT

## 2020-04-02 PROCEDURE — 92250 FUNDUS PHOTOGRAPHY W/I&R: CPT

## 2020-04-02 PROCEDURE — 67028 INJECTION EYE DRUG: CPT

## 2020-04-02 PROCEDURE — 92014 COMPRE OPH EXAM EST PT 1/>: CPT

## 2020-04-02 ASSESSMENT — VISUAL ACUITY
OS_SC: 20/20-2
OD_SC: 20/25-2

## 2020-04-02 ASSESSMENT — TONOMETRY
OD_IOP_MMHG: 17
OS_IOP_MMHG: 14

## 2020-05-07 ENCOUNTER — CLINICAL PROCEDURE AND DIAGNOSTIC TESTING ONLY (OUTPATIENT)
Dept: URBAN - METROPOLITAN AREA CLINIC 26 | Facility: CLINIC | Age: 65
End: 2020-05-07

## 2020-05-07 DIAGNOSIS — H35.713: ICD-10-CM

## 2020-05-07 DIAGNOSIS — H35.3211: ICD-10-CM

## 2020-05-07 PROCEDURE — 92250 FUNDUS PHOTOGRAPHY W/I&R: CPT

## 2020-05-07 PROCEDURE — 67028 INJECTION EYE DRUG: CPT

## 2020-05-07 PROCEDURE — 92134 CPTRZ OPH DX IMG PST SGM RTA: CPT

## 2020-05-07 ASSESSMENT — VISUAL ACUITY: OD_SC: 20/25+1

## 2020-05-07 ASSESSMENT — TONOMETRY: OD_IOP_MMHG: 14

## 2020-06-09 ENCOUNTER — CLINICAL PROCEDURE AND DIAGNOSTIC TESTING ONLY (OUTPATIENT)
Dept: URBAN - METROPOLITAN AREA CLINIC 26 | Facility: CLINIC | Age: 65
End: 2020-06-09

## 2020-06-09 DIAGNOSIS — H35.713: ICD-10-CM

## 2020-06-09 DIAGNOSIS — H35.3211: ICD-10-CM

## 2020-06-09 PROCEDURE — 67028 INJECTION EYE DRUG: CPT

## 2020-06-09 PROCEDURE — 92250 FUNDUS PHOTOGRAPHY W/I&R: CPT

## 2020-06-09 PROCEDURE — 92134 CPTRZ OPH DX IMG PST SGM RTA: CPT

## 2020-06-09 ASSESSMENT — TONOMETRY: OD_IOP_MMHG: 12

## 2020-06-09 ASSESSMENT — VISUAL ACUITY: OD_SC: 20/25-2

## 2020-07-14 ENCOUNTER — CLINICAL PROCEDURE AND DIAGNOSTIC TESTING ONLY (OUTPATIENT)
Dept: URBAN - METROPOLITAN AREA CLINIC 26 | Facility: CLINIC | Age: 65
End: 2020-07-14

## 2020-07-14 DIAGNOSIS — H35.3211: ICD-10-CM

## 2020-07-14 DIAGNOSIS — H35.713: ICD-10-CM

## 2020-07-14 PROCEDURE — 67028 INJECTION EYE DRUG: CPT

## 2020-07-14 PROCEDURE — 92134 CPTRZ OPH DX IMG PST SGM RTA: CPT

## 2020-07-14 PROCEDURE — 92250 FUNDUS PHOTOGRAPHY W/I&R: CPT

## 2020-07-14 ASSESSMENT — TONOMETRY: OD_IOP_MMHG: 17

## 2020-07-14 ASSESSMENT — VISUAL ACUITY: OD_SC: 20/25-2

## 2020-08-25 ENCOUNTER — CLINICAL PROCEDURE AND DIAGNOSTIC TESTING ONLY (OUTPATIENT)
Dept: URBAN - METROPOLITAN AREA CLINIC 26 | Facility: CLINIC | Age: 65
End: 2020-08-25

## 2020-08-25 DIAGNOSIS — H35.3122: ICD-10-CM

## 2020-08-25 DIAGNOSIS — H35.3211: ICD-10-CM

## 2020-08-25 PROCEDURE — 92134 CPTRZ OPH DX IMG PST SGM RTA: CPT

## 2020-08-25 PROCEDURE — 92250 FUNDUS PHOTOGRAPHY W/I&R: CPT

## 2020-08-25 PROCEDURE — 67028 INJECTION EYE DRUG: CPT

## 2020-08-25 ASSESSMENT — VISUAL ACUITY: OD_SC: 20/25-1

## 2020-08-25 ASSESSMENT — TONOMETRY: OD_IOP_MMHG: 12

## 2020-09-29 ENCOUNTER — FOLLOW UP AND POST INJECTION EVALUATION (OUTPATIENT)
Dept: URBAN - METROPOLITAN AREA CLINIC 26 | Facility: CLINIC | Age: 65
End: 2020-09-29

## 2020-09-29 DIAGNOSIS — H35.3211: ICD-10-CM

## 2020-09-29 DIAGNOSIS — H43.393: ICD-10-CM

## 2020-09-29 DIAGNOSIS — H35.3122: ICD-10-CM

## 2020-09-29 DIAGNOSIS — H43.811: ICD-10-CM

## 2020-09-29 DIAGNOSIS — H35.713: ICD-10-CM

## 2020-09-29 PROCEDURE — 92014 COMPRE OPH EXAM EST PT 1/>: CPT

## 2020-09-29 PROCEDURE — 92134 CPTRZ OPH DX IMG PST SGM RTA: CPT

## 2020-09-29 PROCEDURE — 92250 FUNDUS PHOTOGRAPHY W/I&R: CPT

## 2020-09-29 PROCEDURE — 67028 INJECTION EYE DRUG: CPT

## 2020-09-29 ASSESSMENT — VISUAL ACUITY
OD_SC: 20/25-1
OS_SC: 20/20

## 2020-09-29 ASSESSMENT — TONOMETRY
OS_IOP_MMHG: 14
OD_IOP_MMHG: 17

## 2020-11-03 ENCOUNTER — CLINICAL PROCEDURE AND DIAGNOSTIC TESTING ONLY (OUTPATIENT)
Dept: URBAN - METROPOLITAN AREA CLINIC 26 | Facility: CLINIC | Age: 65
End: 2020-11-03

## 2020-11-03 DIAGNOSIS — H35.3211: ICD-10-CM

## 2020-11-03 DIAGNOSIS — H35.3122: ICD-10-CM

## 2020-11-03 PROCEDURE — 92134 CPTRZ OPH DX IMG PST SGM RTA: CPT

## 2020-11-03 PROCEDURE — 67028 INJECTION EYE DRUG: CPT

## 2020-11-03 PROCEDURE — 92250 FUNDUS PHOTOGRAPHY W/I&R: CPT

## 2020-11-03 ASSESSMENT — TONOMETRY: OD_IOP_MMHG: 12

## 2020-11-03 ASSESSMENT — VISUAL ACUITY: OD_SC: 20/25-2

## 2020-12-08 ENCOUNTER — CLINICAL PROCEDURE AND DIAGNOSTIC TESTING ONLY (OUTPATIENT)
Dept: URBAN - METROPOLITAN AREA CLINIC 26 | Facility: CLINIC | Age: 65
End: 2020-12-08

## 2020-12-08 DIAGNOSIS — H35.3122: ICD-10-CM

## 2020-12-08 DIAGNOSIS — H35.3211: ICD-10-CM

## 2020-12-08 PROCEDURE — 92250 FUNDUS PHOTOGRAPHY W/I&R: CPT

## 2020-12-08 PROCEDURE — 67028 INJECTION EYE DRUG: CPT

## 2020-12-08 ASSESSMENT — VISUAL ACUITY: OD_SC: 20/25-2

## 2020-12-08 ASSESSMENT — TONOMETRY: OD_IOP_MMHG: 13

## 2021-01-12 ENCOUNTER — CLINICAL PROCEDURE AND DIAGNOSTIC TESTING ONLY (OUTPATIENT)
Dept: URBAN - METROPOLITAN AREA CLINIC 26 | Facility: CLINIC | Age: 66
End: 2021-01-12

## 2021-01-12 DIAGNOSIS — H35.3122: ICD-10-CM

## 2021-01-12 DIAGNOSIS — H35.3211: ICD-10-CM

## 2021-01-12 PROCEDURE — 92134 CPTRZ OPH DX IMG PST SGM RTA: CPT

## 2021-01-12 PROCEDURE — 67028 INJECTION EYE DRUG: CPT

## 2021-01-12 PROCEDURE — 92250 FUNDUS PHOTOGRAPHY W/I&R: CPT

## 2021-01-12 ASSESSMENT — VISUAL ACUITY: OD_SC: 20/25-2

## 2021-01-12 ASSESSMENT — TONOMETRY: OD_IOP_MMHG: 13

## 2021-02-16 ENCOUNTER — CLINICAL PROCEDURE AND DIAGNOSTIC TESTING ONLY (OUTPATIENT)
Dept: URBAN - METROPOLITAN AREA CLINIC 26 | Facility: CLINIC | Age: 66
End: 2021-02-16

## 2021-02-16 DIAGNOSIS — H35.713: ICD-10-CM

## 2021-02-16 DIAGNOSIS — H35.3211: ICD-10-CM

## 2021-02-16 PROCEDURE — 67028 INJECTION EYE DRUG: CPT

## 2021-02-16 PROCEDURE — 92250 FUNDUS PHOTOGRAPHY W/I&R: CPT

## 2021-02-16 PROCEDURE — 92134 CPTRZ OPH DX IMG PST SGM RTA: CPT

## 2021-02-16 ASSESSMENT — TONOMETRY: OD_IOP_MMHG: 15

## 2021-02-16 ASSESSMENT — VISUAL ACUITY: OD_SC: 20/30-1

## 2021-03-23 ENCOUNTER — FOLLOW UP AND POST INJECTION EVALUATION (OUTPATIENT)
Dept: URBAN - METROPOLITAN AREA CLINIC 26 | Facility: CLINIC | Age: 66
End: 2021-03-23

## 2021-03-23 VITALS — WEIGHT: 107 LBS | HEIGHT: 65 IN | BODY MASS INDEX: 17.83 KG/M2

## 2021-03-23 DIAGNOSIS — H35.3211: ICD-10-CM

## 2021-03-23 DIAGNOSIS — H43.393: ICD-10-CM

## 2021-03-23 DIAGNOSIS — H35.713: ICD-10-CM

## 2021-03-23 DIAGNOSIS — H35.3122: ICD-10-CM

## 2021-03-23 DIAGNOSIS — H43.811: ICD-10-CM

## 2021-03-23 PROCEDURE — 92014 COMPRE OPH EXAM EST PT 1/>: CPT

## 2021-03-23 PROCEDURE — 92250 FUNDUS PHOTOGRAPHY W/I&R: CPT

## 2021-03-23 PROCEDURE — 92134 CPTRZ OPH DX IMG PST SGM RTA: CPT

## 2021-03-23 PROCEDURE — 67028 INJECTION EYE DRUG: CPT

## 2021-03-23 ASSESSMENT — TONOMETRY
OS_IOP_MMHG: 13
OD_IOP_MMHG: 15

## 2021-03-23 ASSESSMENT — VISUAL ACUITY
OD_SC: 20/25
OS_SC: 20/20-1

## 2021-04-27 ENCOUNTER — CLINICAL PROCEDURE AND DIAGNOSTIC TESTING ONLY (OUTPATIENT)
Dept: URBAN - METROPOLITAN AREA CLINIC 26 | Facility: CLINIC | Age: 66
End: 2021-04-27

## 2021-04-27 DIAGNOSIS — H35.713: ICD-10-CM

## 2021-04-27 DIAGNOSIS — H35.3211: ICD-10-CM

## 2021-04-27 PROCEDURE — 92134 CPTRZ OPH DX IMG PST SGM RTA: CPT

## 2021-04-27 PROCEDURE — 67028 INJECTION EYE DRUG: CPT

## 2021-04-27 PROCEDURE — 92250 FUNDUS PHOTOGRAPHY W/I&R: CPT

## 2021-04-27 ASSESSMENT — VISUAL ACUITY: OD_SC: 20/25-1

## 2021-04-27 ASSESSMENT — TONOMETRY: OD_IOP_MMHG: 19

## 2021-06-07 ENCOUNTER — CLINICAL PROCEDURE AND DIAGNOSTIC TESTING ONLY (OUTPATIENT)
Dept: URBAN - METROPOLITAN AREA CLINIC 26 | Facility: CLINIC | Age: 66
End: 2021-06-07

## 2021-06-07 DIAGNOSIS — H35.3211: ICD-10-CM

## 2021-06-07 DIAGNOSIS — H35.713: ICD-10-CM

## 2021-06-07 PROCEDURE — 92250 FUNDUS PHOTOGRAPHY W/I&R: CPT

## 2021-06-07 PROCEDURE — 92134 CPTRZ OPH DX IMG PST SGM RTA: CPT

## 2021-06-07 PROCEDURE — 67028 INJECTION EYE DRUG: CPT

## 2021-06-07 ASSESSMENT — VISUAL ACUITY: OD_SC: 20/25+1

## 2021-06-07 ASSESSMENT — TONOMETRY: OD_IOP_MMHG: 17

## 2021-07-12 ENCOUNTER — CLINICAL PROCEDURE AND DIAGNOSTIC TESTING ONLY (OUTPATIENT)
Dept: URBAN - METROPOLITAN AREA CLINIC 26 | Facility: CLINIC | Age: 66
End: 2021-07-12

## 2021-07-12 DIAGNOSIS — H35.713: ICD-10-CM

## 2021-07-12 DIAGNOSIS — H35.3211: ICD-10-CM

## 2021-07-12 PROCEDURE — 67028 INJECTION EYE DRUG: CPT

## 2021-07-12 PROCEDURE — 92134 CPTRZ OPH DX IMG PST SGM RTA: CPT

## 2021-07-12 PROCEDURE — 92250 FUNDUS PHOTOGRAPHY W/I&R: CPT

## 2021-07-12 ASSESSMENT — TONOMETRY: OD_IOP_MMHG: 09

## 2021-07-12 ASSESSMENT — VISUAL ACUITY: OD_SC: 20/25-1

## 2021-08-23 ENCOUNTER — CLINICAL PROCEDURE AND DIAGNOSTIC TESTING ONLY (OUTPATIENT)
Dept: URBAN - METROPOLITAN AREA CLINIC 26 | Facility: CLINIC | Age: 66
End: 2021-08-23

## 2021-08-23 DIAGNOSIS — H35.3211: ICD-10-CM

## 2021-08-23 DIAGNOSIS — H35.3122: ICD-10-CM

## 2021-08-23 PROCEDURE — 67028 INJECTION EYE DRUG: CPT

## 2021-08-23 PROCEDURE — 92134 CPTRZ OPH DX IMG PST SGM RTA: CPT

## 2021-08-23 PROCEDURE — 92250 FUNDUS PHOTOGRAPHY W/I&R: CPT

## 2021-08-23 ASSESSMENT — TONOMETRY: OD_IOP_MMHG: 12

## 2021-08-23 ASSESSMENT — VISUAL ACUITY: OD_SC: 20/30-1

## 2021-09-27 ENCOUNTER — FOLLOW UP AND POST INJECTION EVALUATION (OUTPATIENT)
Dept: URBAN - METROPOLITAN AREA CLINIC 26 | Facility: CLINIC | Age: 66
End: 2021-09-27

## 2021-09-27 DIAGNOSIS — H35.713: ICD-10-CM

## 2021-09-27 DIAGNOSIS — H35.3122: ICD-10-CM

## 2021-09-27 DIAGNOSIS — H04.123: ICD-10-CM

## 2021-09-27 DIAGNOSIS — H43.393: ICD-10-CM

## 2021-09-27 DIAGNOSIS — H35.3211: ICD-10-CM

## 2021-09-27 DIAGNOSIS — H43.811: ICD-10-CM

## 2021-09-27 PROCEDURE — 67028 INJECTION EYE DRUG: CPT

## 2021-09-27 PROCEDURE — 92250 FUNDUS PHOTOGRAPHY W/I&R: CPT

## 2021-09-27 PROCEDURE — 92134 CPTRZ OPH DX IMG PST SGM RTA: CPT

## 2021-09-27 PROCEDURE — 92014 COMPRE OPH EXAM EST PT 1/>: CPT

## 2021-09-27 ASSESSMENT — TONOMETRY
OD_IOP_MMHG: 12
OS_IOP_MMHG: 14

## 2021-09-27 ASSESSMENT — VISUAL ACUITY
OS_SC: 20/20-2
OD_SC: 20/30

## 2021-11-01 ENCOUNTER — CLINICAL PROCEDURE AND DIAGNOSTIC TESTING ONLY (OUTPATIENT)
Dept: URBAN - METROPOLITAN AREA CLINIC 26 | Facility: CLINIC | Age: 66
End: 2021-11-01

## 2021-11-01 DIAGNOSIS — H35.3122: ICD-10-CM

## 2021-11-01 DIAGNOSIS — H35.3211: ICD-10-CM

## 2021-11-01 PROCEDURE — 92134 CPTRZ OPH DX IMG PST SGM RTA: CPT

## 2021-11-01 PROCEDURE — 67028 INJECTION EYE DRUG: CPT

## 2021-11-01 PROCEDURE — 92250 FUNDUS PHOTOGRAPHY W/I&R: CPT

## 2021-11-01 ASSESSMENT — VISUAL ACUITY: OD_SC: 20/30-2

## 2021-11-01 ASSESSMENT — TONOMETRY: OD_IOP_MMHG: 16

## 2021-12-06 ENCOUNTER — CLINIC PROCEDURE ONLY (OUTPATIENT)
Dept: URBAN - METROPOLITAN AREA CLINIC 26 | Facility: CLINIC | Age: 66
End: 2021-12-06

## 2021-12-06 DIAGNOSIS — H35.3211: ICD-10-CM

## 2021-12-06 DIAGNOSIS — H35.3122: ICD-10-CM

## 2021-12-06 DIAGNOSIS — H35.713: ICD-10-CM

## 2021-12-06 PROCEDURE — 67028 INJECTION EYE DRUG: CPT

## 2021-12-06 PROCEDURE — 92250 FUNDUS PHOTOGRAPHY W/I&R: CPT

## 2021-12-06 PROCEDURE — 92134 CPTRZ OPH DX IMG PST SGM RTA: CPT

## 2021-12-06 ASSESSMENT — TONOMETRY: OD_IOP_MMHG: 16

## 2021-12-06 ASSESSMENT — VISUAL ACUITY: OD_SC: 20/30-2

## 2022-01-10 ENCOUNTER — CLINIC PROCEDURE ONLY (OUTPATIENT)
Dept: URBAN - METROPOLITAN AREA CLINIC 26 | Facility: CLINIC | Age: 67
End: 2022-01-10

## 2022-01-10 DIAGNOSIS — H35.3122: ICD-10-CM

## 2022-01-10 DIAGNOSIS — H35.3211: ICD-10-CM

## 2022-01-10 PROCEDURE — 92250 FUNDUS PHOTOGRAPHY W/I&R: CPT

## 2022-01-10 PROCEDURE — 92134 CPTRZ OPH DX IMG PST SGM RTA: CPT

## 2022-01-10 PROCEDURE — 67028 INJECTION EYE DRUG: CPT

## 2022-01-10 ASSESSMENT — VISUAL ACUITY: OD_SC: 20/30+2

## 2022-01-10 ASSESSMENT — TONOMETRY: OD_IOP_MMHG: 16

## 2022-02-15 ENCOUNTER — CLINIC PROCEDURE ONLY (OUTPATIENT)
Dept: URBAN - METROPOLITAN AREA CLINIC 26 | Facility: CLINIC | Age: 67
End: 2022-02-15

## 2022-02-15 DIAGNOSIS — H35.713: ICD-10-CM

## 2022-02-15 DIAGNOSIS — H35.3211: ICD-10-CM

## 2022-02-15 PROCEDURE — 92134 CPTRZ OPH DX IMG PST SGM RTA: CPT

## 2022-02-15 PROCEDURE — 67028 INJECTION EYE DRUG: CPT

## 2022-02-15 PROCEDURE — 92250 FUNDUS PHOTOGRAPHY W/I&R: CPT

## 2022-02-15 ASSESSMENT — TONOMETRY: OD_IOP_MMHG: 17

## 2022-03-22 ENCOUNTER — CLINIC PROCEDURE ONLY (OUTPATIENT)
Dept: URBAN - METROPOLITAN AREA CLINIC 26 | Facility: CLINIC | Age: 67
End: 2022-03-22

## 2022-03-22 DIAGNOSIS — H35.3211: ICD-10-CM

## 2022-03-22 DIAGNOSIS — H35.713: ICD-10-CM

## 2022-03-22 PROCEDURE — 92134 CPTRZ OPH DX IMG PST SGM RTA: CPT

## 2022-03-22 PROCEDURE — 67028 INJECTION EYE DRUG: CPT

## 2022-03-22 ASSESSMENT — TONOMETRY: OD_IOP_MMHG: 16

## 2022-04-27 ENCOUNTER — COMPREHENSIVE EXAM (OUTPATIENT)
Dept: URBAN - METROPOLITAN AREA CLINIC 26 | Facility: CLINIC | Age: 67
End: 2022-04-27

## 2022-04-27 DIAGNOSIS — H35.3122: ICD-10-CM

## 2022-04-27 DIAGNOSIS — H35.713: ICD-10-CM

## 2022-04-27 DIAGNOSIS — H43.811: ICD-10-CM

## 2022-04-27 DIAGNOSIS — H43.393: ICD-10-CM

## 2022-04-27 DIAGNOSIS — H04.123: ICD-10-CM

## 2022-04-27 DIAGNOSIS — H35.3211: ICD-10-CM

## 2022-04-27 PROCEDURE — 92014 COMPRE OPH EXAM EST PT 1/>: CPT

## 2022-04-27 PROCEDURE — 67028 INJECTION EYE DRUG: CPT

## 2022-04-27 PROCEDURE — 92250 FUNDUS PHOTOGRAPHY W/I&R: CPT

## 2022-04-27 PROCEDURE — 92134 CPTRZ OPH DX IMG PST SGM RTA: CPT

## 2022-04-27 ASSESSMENT — TONOMETRY
OD_IOP_MMHG: 17
OS_IOP_MMHG: 17

## 2022-04-27 ASSESSMENT — VISUAL ACUITY
OD_SC: 20/40+2
OS_SC: 20/30-1

## 2022-05-27 ENCOUNTER — COMPREHENSIVE EXAM (OUTPATIENT)
Dept: URBAN - METROPOLITAN AREA CLINIC 26 | Facility: CLINIC | Age: 67
End: 2022-05-27

## 2022-06-01 ENCOUNTER — COMPREHENSIVE EXAM (OUTPATIENT)
Dept: URBAN - METROPOLITAN AREA CLINIC 26 | Facility: CLINIC | Age: 67
End: 2022-06-01

## 2022-06-01 DIAGNOSIS — H35.3122: ICD-10-CM

## 2022-06-01 DIAGNOSIS — H35.3211: ICD-10-CM

## 2022-06-01 PROCEDURE — 92134 CPTRZ OPH DX IMG PST SGM RTA: CPT

## 2022-06-01 PROCEDURE — 67028 INJECTION EYE DRUG: CPT

## 2022-06-01 PROCEDURE — 92250 FUNDUS PHOTOGRAPHY W/I&R: CPT

## 2022-06-01 ASSESSMENT — TONOMETRY: OD_IOP_MMHG: 13

## 2022-07-13 ENCOUNTER — CLINIC PROCEDURE ONLY (OUTPATIENT)
Dept: URBAN - METROPOLITAN AREA CLINIC 26 | Facility: CLINIC | Age: 67
End: 2022-07-13

## 2022-07-13 DIAGNOSIS — H35.3122: ICD-10-CM

## 2022-07-13 DIAGNOSIS — H35.3211: ICD-10-CM

## 2022-07-13 PROCEDURE — 92134 CPTRZ OPH DX IMG PST SGM RTA: CPT

## 2022-07-13 PROCEDURE — 92250 FUNDUS PHOTOGRAPHY W/I&R: CPT

## 2022-07-13 PROCEDURE — 67028 INJECTION EYE DRUG: CPT

## 2022-07-13 ASSESSMENT — TONOMETRY: OD_IOP_MMHG: 18

## 2022-09-02 ENCOUNTER — CLINIC PROCEDURE ONLY (OUTPATIENT)
Dept: URBAN - METROPOLITAN AREA CLINIC 26 | Facility: CLINIC | Age: 67
End: 2022-09-02

## 2022-09-02 DIAGNOSIS — H35.3122: ICD-10-CM

## 2022-09-02 DIAGNOSIS — H35.3211: ICD-10-CM

## 2022-09-02 PROCEDURE — 67028 INJECTION EYE DRUG: CPT

## 2022-09-02 PROCEDURE — 92134 CPTRZ OPH DX IMG PST SGM RTA: CPT

## 2022-09-02 ASSESSMENT — TONOMETRY: OD_IOP_MMHG: 13

## 2022-10-26 ENCOUNTER — FOLLOW UP (OUTPATIENT)
Dept: URBAN - METROPOLITAN AREA CLINIC 26 | Facility: CLINIC | Age: 67
End: 2022-10-26

## 2022-10-26 DIAGNOSIS — H04.123: ICD-10-CM

## 2022-10-26 DIAGNOSIS — H43.393: ICD-10-CM

## 2022-10-26 DIAGNOSIS — H43.811: ICD-10-CM

## 2022-10-26 DIAGNOSIS — H35.3122: ICD-10-CM

## 2022-10-26 DIAGNOSIS — H35.3211: ICD-10-CM

## 2022-10-26 DIAGNOSIS — H35.713: ICD-10-CM

## 2022-10-26 PROCEDURE — 67028 INJECTION EYE DRUG: CPT

## 2022-10-26 PROCEDURE — 92134 CPTRZ OPH DX IMG PST SGM RTA: CPT

## 2022-10-26 PROCEDURE — 92250 FUNDUS PHOTOGRAPHY W/I&R: CPT

## 2022-10-26 PROCEDURE — 92014 COMPRE OPH EXAM EST PT 1/>: CPT

## 2022-10-26 ASSESSMENT — VISUAL ACUITY
OD_SC: 20/40+2
OS_SC: 20/30-1

## 2022-10-26 ASSESSMENT — TONOMETRY
OS_IOP_MMHG: 13
OD_IOP_MMHG: 14

## 2023-01-30 ENCOUNTER — FOLLOW UP (OUTPATIENT)
Dept: URBAN - METROPOLITAN AREA CLINIC 26 | Facility: CLINIC | Age: 68
End: 2023-01-30

## 2023-01-30 VITALS — WEIGHT: 108 LBS | HEIGHT: 64 IN | BODY MASS INDEX: 18.44 KG/M2

## 2023-01-30 DIAGNOSIS — H35.713: ICD-10-CM

## 2023-01-30 DIAGNOSIS — H04.123: ICD-10-CM

## 2023-01-30 DIAGNOSIS — H35.3211: ICD-10-CM

## 2023-01-30 DIAGNOSIS — H43.811: ICD-10-CM

## 2023-01-30 DIAGNOSIS — H35.3122: ICD-10-CM

## 2023-01-30 DIAGNOSIS — H43.393: ICD-10-CM

## 2023-01-30 PROCEDURE — 92134 CPTRZ OPH DX IMG PST SGM RTA: CPT

## 2023-01-30 PROCEDURE — 92014 COMPRE OPH EXAM EST PT 1/>: CPT

## 2023-01-30 PROCEDURE — 92250 FUNDUS PHOTOGRAPHY W/I&R: CPT

## 2023-01-30 ASSESSMENT — TONOMETRY
OS_IOP_MMHG: 11
OD_IOP_MMHG: 13

## 2023-01-30 ASSESSMENT — VISUAL ACUITY
OS_SC: 20/50-2
OS_PH: 20/25-2
OD_SC: 20/30-2

## 2023-04-10 ENCOUNTER — COMPREHENSIVE EXAM (OUTPATIENT)
Dept: URBAN - METROPOLITAN AREA CLINIC 26 | Facility: CLINIC | Age: 68
End: 2023-04-10

## 2023-04-10 VITALS — HEIGHT: 64 IN | BODY MASS INDEX: 18.1 KG/M2 | WEIGHT: 106 LBS

## 2023-04-10 DIAGNOSIS — H04.123: ICD-10-CM

## 2023-04-10 DIAGNOSIS — H43.811: ICD-10-CM

## 2023-04-10 DIAGNOSIS — H35.3122: ICD-10-CM

## 2023-04-10 DIAGNOSIS — H43.393: ICD-10-CM

## 2023-04-10 DIAGNOSIS — H35.713: ICD-10-CM

## 2023-04-10 DIAGNOSIS — H35.3211: ICD-10-CM

## 2023-04-10 PROCEDURE — 92134 CPTRZ OPH DX IMG PST SGM RTA: CPT

## 2023-04-10 PROCEDURE — 92250 FUNDUS PHOTOGRAPHY W/I&R: CPT

## 2023-04-10 PROCEDURE — 92014 COMPRE OPH EXAM EST PT 1/>: CPT

## 2023-04-10 ASSESSMENT — TONOMETRY
OD_IOP_MMHG: 11
OS_IOP_MMHG: 10

## 2023-04-10 ASSESSMENT — VISUAL ACUITY
OD_SC: 20/30-2
OS_SC: 20/40-2

## 2023-06-26 ENCOUNTER — COMPREHENSIVE EXAM (OUTPATIENT)
Dept: URBAN - METROPOLITAN AREA CLINIC 26 | Facility: CLINIC | Age: 68
End: 2023-06-26

## 2023-06-26 DIAGNOSIS — H04.123: ICD-10-CM

## 2023-06-26 DIAGNOSIS — H43.393: ICD-10-CM

## 2023-06-26 DIAGNOSIS — H35.713: ICD-10-CM

## 2023-06-26 DIAGNOSIS — H35.3122: ICD-10-CM

## 2023-06-26 DIAGNOSIS — H43.811: ICD-10-CM

## 2023-06-26 DIAGNOSIS — H35.3211: ICD-10-CM

## 2023-06-26 PROCEDURE — 92134 CPTRZ OPH DX IMG PST SGM RTA: CPT

## 2023-06-26 PROCEDURE — 92014 COMPRE OPH EXAM EST PT 1/>: CPT

## 2023-06-26 ASSESSMENT — TONOMETRY
OS_IOP_MMHG: 12
OD_IOP_MMHG: 14

## 2023-06-26 ASSESSMENT — VISUAL ACUITY
OS_SC: 20/20
OD_SC: 20/25-2

## 2023-09-18 ENCOUNTER — FOLLOW UP (OUTPATIENT)
Dept: URBAN - METROPOLITAN AREA CLINIC 26 | Facility: CLINIC | Age: 68
End: 2023-09-18

## 2023-09-18 DIAGNOSIS — H35.3211: ICD-10-CM

## 2023-09-18 DIAGNOSIS — H43.811: ICD-10-CM

## 2023-09-18 DIAGNOSIS — H35.3122: ICD-10-CM

## 2023-09-18 DIAGNOSIS — H35.713: ICD-10-CM

## 2023-09-18 DIAGNOSIS — H43.393: ICD-10-CM

## 2023-09-18 DIAGNOSIS — H04.123: ICD-10-CM

## 2023-09-18 PROCEDURE — 92250 FUNDUS PHOTOGRAPHY W/I&R: CPT

## 2023-09-18 PROCEDURE — 92134 CPTRZ OPH DX IMG PST SGM RTA: CPT

## 2023-09-18 PROCEDURE — 92014 COMPRE OPH EXAM EST PT 1/>: CPT

## 2023-09-18 ASSESSMENT — VISUAL ACUITY
OD_SC: 20/30+2
OS_SC: 20/20-2

## 2023-09-18 ASSESSMENT — TONOMETRY
OD_IOP_MMHG: 13
OS_IOP_MMHG: 11

## 2023-12-11 ENCOUNTER — FOLLOW UP (OUTPATIENT)
Dept: URBAN - METROPOLITAN AREA CLINIC 26 | Facility: CLINIC | Age: 68
End: 2023-12-11

## 2023-12-11 DIAGNOSIS — H35.713: ICD-10-CM

## 2023-12-11 DIAGNOSIS — H02.833: ICD-10-CM

## 2023-12-11 DIAGNOSIS — H02.836: ICD-10-CM

## 2023-12-11 DIAGNOSIS — H35.3122: ICD-10-CM

## 2023-12-11 DIAGNOSIS — H43.393: ICD-10-CM

## 2023-12-11 DIAGNOSIS — H35.3211: ICD-10-CM

## 2023-12-11 DIAGNOSIS — H43.811: ICD-10-CM

## 2023-12-11 DIAGNOSIS — H04.123: ICD-10-CM

## 2023-12-11 PROCEDURE — 92012 INTRM OPH EXAM EST PATIENT: CPT

## 2023-12-11 PROCEDURE — 92134 CPTRZ OPH DX IMG PST SGM RTA: CPT

## 2023-12-11 ASSESSMENT — TONOMETRY
OS_IOP_MMHG: 12
OD_IOP_MMHG: 11

## 2023-12-11 ASSESSMENT — VISUAL ACUITY
OS_SC: 20/20-2
OD_SC: 20/25+1

## 2024-03-06 ENCOUNTER — FOLLOW UP (OUTPATIENT)
Dept: URBAN - METROPOLITAN AREA CLINIC 26 | Facility: CLINIC | Age: 69
End: 2024-03-06

## 2024-03-06 VITALS — WEIGHT: 107 LBS | BODY MASS INDEX: 18.27 KG/M2 | HEIGHT: 64 IN

## 2024-03-06 DIAGNOSIS — H35.3122: ICD-10-CM

## 2024-03-06 DIAGNOSIS — H04.123: ICD-10-CM

## 2024-03-06 DIAGNOSIS — H35.713: ICD-10-CM

## 2024-03-06 DIAGNOSIS — H02.833: ICD-10-CM

## 2024-03-06 DIAGNOSIS — H43.811: ICD-10-CM

## 2024-03-06 DIAGNOSIS — H35.3211: ICD-10-CM

## 2024-03-06 DIAGNOSIS — H43.393: ICD-10-CM

## 2024-03-06 DIAGNOSIS — H02.836: ICD-10-CM

## 2024-03-06 PROCEDURE — 92134 CPTRZ OPH DX IMG PST SGM RTA: CPT

## 2024-03-06 PROCEDURE — 92014 COMPRE OPH EXAM EST PT 1/>: CPT

## 2024-03-06 PROCEDURE — 92250 FUNDUS PHOTOGRAPHY W/I&R: CPT

## 2024-03-06 ASSESSMENT — TONOMETRY
OS_IOP_MMHG: 11
OD_IOP_MMHG: 7

## 2024-03-06 ASSESSMENT — VISUAL ACUITY
OD_SC: 20/30-1
OS_SC: 20/25+2

## 2024-05-22 ENCOUNTER — FOLLOW UP (OUTPATIENT)
Dept: URBAN - METROPOLITAN AREA CLINIC 26 | Facility: CLINIC | Age: 69
End: 2024-05-22

## 2024-05-22 DIAGNOSIS — H04.123: ICD-10-CM

## 2024-05-22 DIAGNOSIS — H35.713: ICD-10-CM

## 2024-05-22 DIAGNOSIS — H02.833: ICD-10-CM

## 2024-05-22 DIAGNOSIS — H43.393: ICD-10-CM

## 2024-05-22 DIAGNOSIS — H02.836: ICD-10-CM

## 2024-05-22 DIAGNOSIS — H35.3122: ICD-10-CM

## 2024-05-22 DIAGNOSIS — H35.3211: ICD-10-CM

## 2024-05-22 DIAGNOSIS — H43.811: ICD-10-CM

## 2024-05-22 PROCEDURE — 92134 CPTRZ OPH DX IMG PST SGM RTA: CPT

## 2024-05-22 PROCEDURE — 92014 COMPRE OPH EXAM EST PT 1/>: CPT

## 2024-05-22 PROCEDURE — 92250 FUNDUS PHOTOGRAPHY W/I&R: CPT | Mod: NC

## 2024-05-22 ASSESSMENT — VISUAL ACUITY
OD_SC: 20/30+2
OS_SC: 20/20-2

## 2024-05-22 ASSESSMENT — TONOMETRY
OS_IOP_MMHG: 14
OD_IOP_MMHG: 15

## 2024-08-14 ENCOUNTER — FOLLOW UP (OUTPATIENT)
Dept: URBAN - METROPOLITAN AREA CLINIC 26 | Facility: CLINIC | Age: 69
End: 2024-08-14

## 2024-08-14 DIAGNOSIS — H35.3211: ICD-10-CM

## 2024-08-14 DIAGNOSIS — H02.833: ICD-10-CM

## 2024-08-14 DIAGNOSIS — H35.713: ICD-10-CM

## 2024-08-14 DIAGNOSIS — H02.836: ICD-10-CM

## 2024-08-14 DIAGNOSIS — H43.393: ICD-10-CM

## 2024-08-14 DIAGNOSIS — H35.3122: ICD-10-CM

## 2024-08-14 DIAGNOSIS — H43.811: ICD-10-CM

## 2024-08-14 DIAGNOSIS — H04.123: ICD-10-CM

## 2024-08-14 PROCEDURE — 67028 INJECTION EYE DRUG: CPT

## 2024-08-14 PROCEDURE — 92014 COMPRE OPH EXAM EST PT 1/>: CPT | Mod: 25

## 2024-08-14 PROCEDURE — 92134 CPTRZ OPH DX IMG PST SGM RTA: CPT

## 2024-08-14 ASSESSMENT — TONOMETRY
OS_IOP_MMHG: 11
OD_IOP_MMHG: 11

## 2024-08-14 ASSESSMENT — VISUAL ACUITY
OS_SC: 20/20-1
OD_SC: 20/20-2

## 2024-10-21 ENCOUNTER — CLINIC PROCEDURE ONLY (OUTPATIENT)
Dept: URBAN - METROPOLITAN AREA CLINIC 26 | Facility: CLINIC | Age: 69
End: 2024-10-21

## 2024-10-21 DIAGNOSIS — H35.713: ICD-10-CM

## 2024-10-21 DIAGNOSIS — H35.3211: ICD-10-CM

## 2024-10-21 PROCEDURE — 67028 INJECTION EYE DRUG: CPT

## 2024-10-21 PROCEDURE — 92250 FUNDUS PHOTOGRAPHY W/I&R: CPT | Mod: 59

## 2024-10-21 PROCEDURE — J2777PFS VABYSMO PFS: Mod: JZ

## 2024-10-21 PROCEDURE — 92134 CPTRZ OPH DX IMG PST SGM RTA: CPT

## 2024-12-16 ENCOUNTER — CLINIC PROCEDURE ONLY (OUTPATIENT)
Age: 69
End: 2024-12-16

## 2024-12-16 DIAGNOSIS — H35.3211: ICD-10-CM

## 2024-12-16 DIAGNOSIS — H35.713: ICD-10-CM

## 2024-12-16 PROCEDURE — 92134 CPTRZ OPH DX IMG PST SGM RTA: CPT

## 2024-12-16 PROCEDURE — J2777PFS VABYSMO PFS: Mod: JZ

## 2024-12-16 PROCEDURE — 92250 FUNDUS PHOTOGRAPHY W/I&R: CPT | Mod: 59

## 2024-12-16 PROCEDURE — 67028 INJECTION EYE DRUG: CPT

## 2025-02-24 ENCOUNTER — CLINIC PROCEDURE ONLY (OUTPATIENT)
Age: 70
End: 2025-02-24

## 2025-02-24 DIAGNOSIS — H35.713: ICD-10-CM

## 2025-02-24 DIAGNOSIS — H35.3211: ICD-10-CM

## 2025-02-24 PROCEDURE — 92250 FUNDUS PHOTOGRAPHY W/I&R: CPT | Mod: 59

## 2025-02-24 PROCEDURE — 92134 CPTRZ OPH DX IMG PST SGM RTA: CPT

## 2025-02-24 PROCEDURE — 67028 INJECTION EYE DRUG: CPT

## 2025-02-24 PROCEDURE — J2777PFS VABYSMO PFS: Mod: JZ

## 2025-04-21 ENCOUNTER — CLINIC PROCEDURE ONLY (OUTPATIENT)
Age: 70
End: 2025-04-21

## 2025-04-21 DIAGNOSIS — H35.713: ICD-10-CM

## 2025-04-21 DIAGNOSIS — H35.3211: ICD-10-CM

## 2025-04-21 PROCEDURE — 92250 FUNDUS PHOTOGRAPHY W/I&R: CPT | Mod: 59

## 2025-04-21 PROCEDURE — 67028 INJECTION EYE DRUG: CPT

## 2025-04-21 PROCEDURE — J2777PFS VABYSMO PFS: Mod: JZ

## 2025-04-21 PROCEDURE — 92134 CPTRZ OPH DX IMG PST SGM RTA: CPT

## 2025-06-23 ENCOUNTER — CLINIC PROCEDURE ONLY (OUTPATIENT)
Age: 70
End: 2025-06-23

## 2025-06-23 DIAGNOSIS — H35.3211: ICD-10-CM

## 2025-06-23 DIAGNOSIS — H35.713: ICD-10-CM

## 2025-06-23 PROCEDURE — 92250 FUNDUS PHOTOGRAPHY W/I&R: CPT | Mod: 59

## 2025-06-23 PROCEDURE — J2777PFS VABYSMO PFS: Mod: JZ

## 2025-06-23 PROCEDURE — 92134 CPTRZ OPH DX IMG PST SGM RTA: CPT

## 2025-06-23 PROCEDURE — 67028 INJECTION EYE DRUG: CPT

## 2025-08-20 ENCOUNTER — COMPREHENSIVE EXAM (OUTPATIENT)
Age: 70
End: 2025-08-20

## 2025-08-20 DIAGNOSIS — H43.811: ICD-10-CM

## 2025-08-20 DIAGNOSIS — H35.3211: ICD-10-CM

## 2025-08-20 DIAGNOSIS — H35.713: ICD-10-CM

## 2025-08-20 DIAGNOSIS — H02.836: ICD-10-CM

## 2025-08-20 DIAGNOSIS — H43.393: ICD-10-CM

## 2025-08-20 DIAGNOSIS — H35.3122: ICD-10-CM

## 2025-08-20 DIAGNOSIS — H04.123: ICD-10-CM

## 2025-08-20 DIAGNOSIS — H02.833: ICD-10-CM

## 2025-08-20 PROCEDURE — 92134 CPTRZ OPH DX IMG PST SGM RTA: CPT

## 2025-08-20 PROCEDURE — 92250 FUNDUS PHOTOGRAPHY W/I&R: CPT | Mod: 59

## 2025-08-20 PROCEDURE — J2777PFS VABYSMO PFS: Mod: JZ,RT

## 2025-08-20 PROCEDURE — 92014 COMPRE OPH EXAM EST PT 1/>: CPT | Mod: 25

## 2025-08-20 PROCEDURE — 67028 INJECTION EYE DRUG: CPT
